# Patient Record
Sex: FEMALE | Race: WHITE | NOT HISPANIC OR LATINO | Employment: OTHER | URBAN - METROPOLITAN AREA
[De-identification: names, ages, dates, MRNs, and addresses within clinical notes are randomized per-mention and may not be internally consistent; named-entity substitution may affect disease eponyms.]

---

## 2023-09-20 ENCOUNTER — OFFICE VISIT (OUTPATIENT)
Dept: ORTHOPEDIC SURGERY | Facility: CLINIC | Age: 63
End: 2023-09-20
Payer: COMMERCIAL

## 2023-09-20 VITALS
WEIGHT: 199.2 LBS | DIASTOLIC BLOOD PRESSURE: 82 MMHG | BODY MASS INDEX: 39.11 KG/M2 | HEIGHT: 60 IN | SYSTOLIC BLOOD PRESSURE: 121 MMHG

## 2023-09-20 DIAGNOSIS — G56.03 CARPAL TUNNEL SYNDROME, BILATERAL: Primary | ICD-10-CM

## 2023-09-20 RX ORDER — DULOXETIN HYDROCHLORIDE 60 MG/1
60 CAPSULE, DELAYED RELEASE ORAL DAILY
COMMUNITY

## 2023-09-20 RX ORDER — ATENOLOL 50 MG/1
50 TABLET ORAL DAILY
COMMUNITY

## 2023-09-20 RX ORDER — LISINOPRIL AND HYDROCHLOROTHIAZIDE 12.5; 1 MG/1; MG/1
1 TABLET ORAL DAILY
COMMUNITY

## 2023-09-20 NOTE — PROGRESS NOTES
[facility]opedic     Office Visit       Date: 09/20/2023   Patient Name: Tamara Mantilla  MRN: [id number]  YOB: 1960    Referring Physician: Ed Eng, *     Chief Complaint:   Chief Complaint   Patient presents with    Pain     Bilateral wrist pain       History of Present Illness:   Tamara Mantilla is a 63 y.o. female RHD who presents with history of bilateral hand numbness and tingling. Patient is unclear how long it has been going on, but reports worsening symptoms over the last several months.  Reports numbness and tingling in the thumb, index and middle finger that was initially intermittent bilaterally but has now progressed to constant on the right side.  Her symptoms are worse at night and wakes her from sleep.  Has not tried conservative measures such as night splinting.  Reports weakness or clumsiness of the affected hand that interferes with her quality of life.    Otherwise healthy. Patient is retired. Denies smoking.         Subjective   Review of Systems:   Review of Systems   Constitutional: Negative.  Negative for chills, fatigue and fever.   HENT: Negative.  Negative for congestion and dental problem.    Eyes: Negative.  Negative for blurred vision.   Respiratory: Negative.  Negative for shortness of breath.    Cardiovascular: Negative.  Negative for leg swelling.   Gastrointestinal: Negative.  Negative for abdominal pain.   Endocrine: Negative.  Negative for polyuria.   Genitourinary: Negative.  Negative for difficulty urinating.   Musculoskeletal:  Positive for arthralgias.   Skin: Negative.    Allergic/Immunologic: Negative.    Neurological: Negative.    Hematological: Negative.  Negative for adenopathy.   Psychiatric/Behavioral: Negative.  Negative for behavioral problems.       Pertinent review of systems per HPI.     I reviewed the patient's chief complaint, history of present illness, review of  "systems, past medical history, surgical history, family history, social history, medications and allergy list.    Objective    Vital Signs:   Vitals:    09/20/23 1454   BP: 121/82   Weight: 90.4 kg (199 lb 3.2 oz)   Height: 151.1 cm (59.5\")     BMI: Class 2 Severe Obesity (BMI >=35 and <=39.9). Obesity-related health conditions include the following: none. Obesity is unchanged. BMI is is above average; no BMI management plan is appropriate.     General Appearance: No acute distress. Alert and oriented.     Chest:  Non-labored breathing on room air. Regular rate and rhythm.    Right Upper Extremity Exam:    No palpable masses or visible lesions  Fingers are warm, well-perfused with appropriate capillary refill.  Palpable radial pulse.    Sensation intact to light touch in median, radial and ulnar nerve distributions.    Motor- Fires FPL, ulnar intrinsics, EPL/EDC w/ full active and passive range of motion. Strength intact.    Non-tender except for in the areas highlighted below    Sensation Decreased to light touch in median nerve distribution  Tinel's Sign-- positive  Phalen's Test--positive   Carpal Compression Test--Positive  Thenar wasting--Negative  Elbow Flexion test--negative  Cubital tunnel signs--negative    Left Upper Extremity Exam:    No palpable masses or visible lesions  Fingers are warm, well-perfused with appropriate capillary refill.  Palpable radial pulse.    Sensation intact to light touch in median, radial and ulnar nerve distributions.    Motor- Fires FPL, ulnar intrinsics, EPL/EDC w/ full active and passive range of motion. Strength intact.    Non-tender except for in the areas highlighted below    Sensation Decreased to light touch in median nerve distribution  Tinel's Sign-- positive  Phalen's Test--positive   Carpal Compression Test--Positive  Thenar wasting--Negative  Elbow Flexion test--negative  Cubital tunnel signs--negative    CTS-6 Questionnaire        R/L  Symptoms and " History  Numbness in the median nerve territory  Yes/Yes (3.5)   Nocturnal numbness    Yes/Yes (4)   Physical Examination  Thenar atrophy and/or weakness   No/No (5)    Positive Phalen's test    Yes/Yes (5)   Loss of 2-point Discrimination >5 mm  Yes/Yes (4.5)  Positive Tinel sign     Yes/Yes (4)                Total    21/21 (26)      Imaging/Studies:   Imaging Results (Last 24 Hours)       ** No results found for the last 24 hours. **          EMG/nerve conduction studies from 9/1/2023 from Dr. Felix's office were independently reviewed by myself and demonstrate increased motor latency of the median nerve across the wrist of the bilateral wrist as well as increased sensory latency of the median nerve at the left across the wrist and absent sensory signals in the median nerve across the right wrist.    This consistent with bilateral severe carpal tunnel syndrome.    Procedures:  Procedures    Quality Measures:   ACP:   ACP discussion was declined by the patient. Patient does not have an advance directive, declines further assistance.    Tobacco:   Tamara Mantilla  reports that she has never smoked. She has never used smokeless tobacco..    Assessment / Plan    Assessment/Plan:     There are no diagnoses linked to this encounter.     Tamara Mantillais a 63 y.o. female who presents with:      ICD-10-CM ICD-9-CM   1. Carpal tunnel syndrome, bilateral  G56.03 354.0       Regarding Mrs. Mantilla's bilateral carpal tunnel syndrome, the patient has been informed on the various treatment options including surgical release, night splinting and corticosteroid injection.  Given her present presence of severe bilateral carpal tunnel syndrome, I do not believe that night splints or cortical and steroid injection would provide the patient with longstanding relief at this time.  She would benefit from bilateral carpal tunnel release, although given the severity of the demyelination of the right median nerve, she may not have full  recovery despite release.  Despite this I believe that her intermittent symptoms and worsening motor function warrant carpal tunnel release.  I believe that her left carpal tunnel syndrome has a better prognosis with release given its less severe symptoms.  I gave the patient the option of choosing the side that she would like released first and she opted for a right carpal tunnel release to address her dominant hand.  We will proceed with scheduling the surgery as an outpatient.    Consent-  Right open carpal tunnel release    The risks and benefits of the procedure were discussed with the patient and or appropriate guardian, which include but are not limited to the risk of bleeding, infection, neurovascular damage, post-operative stiffness, recurrence, tendon and/or ligament retears, recurrent instability, continued pain, arthritic pain, need for further revision surgeries in the future, deep venous thrombosis, and general risks from anesthesia. We also discussed the post-operative rehabilitation, the need for physical therapy, and the overall expected outcomes from the procedure. We also discussed the possible use of biologics including allograft. We allowed proper time and answered the patient's questions regarding the procedure. The patient expressed understanding. Knowing what the risks are and what the conservative treatment is, the patient elected to forgo any further conservative treatment options and proceed with the surgical intervention.  Patient signed a surgical consent today.      Follow Up:   Return for Follow Up after Post Op.        Jack Lopes MD  [facility] [place] [facility] Upper Extremity Surgeon

## 2023-10-10 ENCOUNTER — DOCUMENTATION (OUTPATIENT)
Dept: ORTHOPEDIC SURGERY | Facility: CLINIC | Age: 63
End: 2023-10-10

## 2023-10-10 ENCOUNTER — OUTSIDE FACILITY SERVICE (OUTPATIENT)
Dept: ORTHOPEDIC SURGERY | Facility: CLINIC | Age: 63
End: 2023-10-10
Payer: COMMERCIAL

## 2023-10-10 NOTE — PROGRESS NOTES
DATE OF PROCEDURE: 10/10/2023     PROCEDURES PERFORMED:    1. Right open carpal tunnel release CPT 71371    SURGEON: Jack Lopes MD      ASSISTANTS:    1. None        * Surgery not found *      ANESTHESIA: Local    PREOPERATIVE DIAGNOSES:  1. Right carpal tunnel syndrome     POSTOPERATIVE DIAGNOSES:    Same     ESTIMATED BLOOD LOSS: 1 mL.    SPECIMENS: None    IMPLANTS: None    COMPLICATIONS: None     INDICATIONS:  Tamara Mantilla is a 63 y.o. female who initially presented with right carpal tunnel syndrome that has failed conservative therapy.  The risks, benefits, alternatives and potential complications of surgery were discussed with the patient including but not limited to bleeding scarring, infection, recurrence, stiffness, damage to surrounding structures and postoperative pain.  The patient understands the risks and agreed to proceed with surgery.  A surgical informed consent was signed prior to the procedure.      DESCRIPTION OF PROCEDURE:  The patient was greeted in the pre-operative holding area and the surgical site was marked and consent confirmed prior to bringing the patient to the operating room.  The patient was then taken to the operating room and a timeout was performed including the patient's name, procedure and antibiotic administration prior to the patient receiving local anesthesia anesthesia.  The patient was positioned supine on the operative room table and a non-sterile tourniquet was applied to the right upper extremity and it was then prepped and draped in the usual sterile fashion.  A combination of 1% lidocaine with epinephrine and 0.5% Marcaine was injected at the site of the incision for local anesthesia.  No antibiotics were given.     A 2.5 cm incision was made over the palm on the ulnar border the anticipated location of the transverse carpal ligament.  Superficial palmar fascia was then incised sharply.  Retractors were placed to allow visualization of the transverse  fibers of the transverse carpal ligament.  This was then carefully incised using a 15 blade.  Proximally the transverse carpal ligament and antebrachial fascia were incised under direct visualization using careful retraction and a tenotomy scissor.  Wound was then irrigated with copious amounts normal saline solution.  Incision was closed with 4-0 nylon in horizontal mattress fashion.  Soft dressing was applied.     At the end of the procedure all instrument counts were correct.  The patient was awoken from anesthesia and transferred to the PACU in stable condition.  I participated in all parts of the case.

## 2023-10-25 ENCOUNTER — OFFICE VISIT (OUTPATIENT)
Dept: ORTHOPEDIC SURGERY | Facility: CLINIC | Age: 63
End: 2023-10-25
Payer: COMMERCIAL

## 2023-10-25 VITALS — TEMPERATURE: 97.6 F

## 2023-10-25 DIAGNOSIS — G56.03 CARPAL TUNNEL SYNDROME, BILATERAL: Primary | ICD-10-CM

## 2023-10-25 PROCEDURE — 99024 POSTOP FOLLOW-UP VISIT: CPT | Performed by: PLASTIC SURGERY

## 2023-10-25 NOTE — PROGRESS NOTES
[facility] Orth[facility]     Follow-up Office Visit       Date: 10/25/2023   Patient Name: Tamara Mantilla  MRN: [id number]  YOB: 1960    Chief Complaint:   Chief Complaint   Patient presents with    Post-op     2 Week Status Post - Right open carpal tunnel release 10/10/23       History of Present Illness:   Tamara Mantilla is a 63 y.o. female presents for follow-up of right open carpal tunnel release on 10/10/2023.  She has been doing well since surgery.  She reports that she still has some numbness at the tip of her right thumb however the remainder of symptoms have resolved.  She has had minimal pain since surgery.  No complaints at this time.    She continues to have symptoms of left carpal tunnel and would like to proceed with left carpal tunnel release at the earliest available time.      Subjective   Review of Systems:   Review of Systems   Constitutional: Negative.  Negative for chills, fatigue and fever.   HENT: Negative.  Negative for congestion and dental problem.    Eyes: Negative.  Negative for blurred vision.   Respiratory: Negative.  Negative for shortness of breath.    Cardiovascular: Negative.  Negative for leg swelling.   Gastrointestinal: Negative.  Negative for abdominal pain.   Endocrine: Negative.  Negative for polyuria.   Genitourinary: Negative.  Negative for difficulty urinating.   Musculoskeletal:  Positive for arthralgias.   Skin: Negative.    Allergic/Immunologic: Negative.    Neurological: Negative.    Hematological: Negative.  Negative for adenopathy.   Psychiatric/Behavioral: Negative.  Negative for behavioral problems.         Pertinent review of systems per HPI    I reviewed the patient's chief complaint, history of present illness, review of systems, past medical history, surgical history, family history, social history, medications and allergy list in the EMR on 10/25/2023 and agree with the findings  above.    Objective    Vital Signs:   Vitals:    10/25/23 0837   Temp: 97.6 °F (36.4 °C)   TempSrc: Tympanic     BMI: Class 2 Severe Obesity (BMI >=35 and <=39.9). Obesity-related health conditions include the following: none. Obesity is unchanged. BMI is is above average; no BMI management plan is appropriate. We discussed portion control and increasing exercise.       General Appearance: No acute distress. Alert and oriented.     Chest:  Non-labored breathing on room air      Right upper Extremity:  Incision healing appropriately.  Clear dry and intact.  Fingers warm and well-perfused distally  Sensation intact to light touch in the median, radian and ulnar nerve distributions    Imaging/Studies:   Imaging Results (Last 24 Hours)       ** No results found for the last 24 hours. **            None reviewed    Procedures:  Procedures    Quality Measures:   ACP:   ACP discussion was declined by the patient. Patient does not have an advance directive, declines further assistance.    Tobacco:   Tamara Mantilla  reports that she has never smoked. She has never been exposed to tobacco smoke. She has never used smokeless tobacco..  Assessment / Plan    Assessment/Plan:      Diagnosis Plan   1. Carpal tunnel syndrome, bilateral          Patient is 2 weeks postop status post right carpal tunnel release.  She is doing very well and happy with the result.  Sutures removed today.  She would like to proceed with left open carpal tunnel release.  Consent signed today.    Consent- Left Carpal Tunnel Release    The risks and benefits of the procedure were discussed with the patient and or appropriate guardian, which include but are not limited to the risk of bleeding, infection, neurovascular damage, post-operative stiffness, recurrence, tendon and/or ligament retears, recurrent instability, continued pain, arthritic pain, need for further revision surgeries in the future, deep venous thrombosis, and general risks from anesthesia.  We also discussed the post-operative rehabilitation, the need for physical therapy, and the overall expected outcomes from the procedure. We also discussed the possible use of biologics including allograft. We allowed proper time and answered the patient's questions regarding the procedure. The patient expressed understanding. Knowing what the risks are and what the conservative treatment is, the patient elected to forgo any further conservative treatment options and proceed with the surgical intervention. A surgical consent was signed.      Follow Up:   No follow-ups on file.        Jack Lopes MD  [facility] [place] [facility]

## 2023-11-21 ENCOUNTER — DOCUMENTATION (OUTPATIENT)
Dept: SURGERY | Facility: HOSPITAL | Age: 63
End: 2023-11-21
Payer: COMMERCIAL

## 2023-11-21 ENCOUNTER — OUTSIDE FACILITY SERVICE (OUTPATIENT)
Dept: ORTHOPEDIC SURGERY | Facility: CLINIC | Age: 63
End: 2023-11-21
Payer: COMMERCIAL

## 2023-11-21 NOTE — PROGRESS NOTES
DATE OF PROCEDURE: 11/21/2023    LOCATION: [facility]     PROCEDURES PERFORMED:    1. Left open carpal tunnel release CPT 96321    SURGEON: Jack Lopes MD      ASSISTANTS:    1. none        * Surgery not found *      ANESTHESIA: Local    PREOPERATIVE DIAGNOSES:  1. left carpal tunnel syndrome     POSTOPERATIVE DIAGNOSES:    Same     ESTIMATED BLOOD LOSS: 5 mL.    SPECIMENS: None    IMPLANTS: None    COMPLICATIONS: None     INDICATIONS:  Tamara Mantilla is a 63 y.o. female who initially presented with left carpal tunnel syndrome that has failed conservative therapy.  The risks, benefits, alternatives and potential complications of surgery were discussed with the patient including but not limited to bleeding scarring, infection, recurrence, stiffness, damage to surrounding structures and postoperative pain.  The patient understands the risks and agreed to proceed with surgery.  A surgical informed consent was signed prior to the procedure.      DESCRIPTION OF PROCEDURE:  The patient was greeted in the pre-operative holding area and the surgical site was marked and consent confirmed prior to bringing the patient to the operating room.  The patient was then taken to the operating room and a timeout was performed including the patient's name, procedure and antibiotic administration prior to the patient receiving local anesthesia anesthesia.  The patient was positioned supine on the operative room table and a non-sterile tourniquet was applied to the left upper extremity and it was then prepped and draped in the usual sterile fashion.  A combination of 1% lidocaine with epinephrine and 0.5% Marcaine was injected at the site of the incision for local anesthesia.  No antibiotics were given.     A 2.5 cm incision was made over the palm on the ulnar border the anticipated location of the transverse carpal ligament.  Superficial palmar fascia was then incised sharply.  Retractors were placed to  allow visualization of the transverse fibers of the transverse carpal ligament.  This was then carefully incised using a 15 blade.  Proximally the transverse carpal ligament and antebrachial fascia were incised under direct visualization using careful retraction and a tenotomy scissor.  Wound was then irrigated with copious amounts normal saline solution.  Incision was closed with 4-0 nylon in horizontal mattress fashion.  Soft dressing was applied.     At the end of the procedure all instrument counts were correct.  The patient was awoken from anesthesia and transferred to the PACU in stable condition.  I participated in all parts of the case.

## 2023-12-06 ENCOUNTER — OFFICE VISIT (OUTPATIENT)
Dept: ORTHOPEDIC SURGERY | Facility: CLINIC | Age: 63
End: 2023-12-06
Payer: COMMERCIAL

## 2023-12-06 VITALS — TEMPERATURE: 97.3 F

## 2023-12-06 DIAGNOSIS — G56.03 CARPAL TUNNEL SYNDROME, BILATERAL: Primary | ICD-10-CM

## 2023-12-06 RX ORDER — CEPHALEXIN 500 MG/1
500 CAPSULE ORAL EVERY 6 HOURS
Qty: 20 CAPSULE | Refills: 0 | Status: SHIPPED | OUTPATIENT
Start: 2023-12-06 | End: 2023-12-11

## 2023-12-06 NOTE — PROGRESS NOTES
[facility] Orthopedic     Follow-up Office Visit       Date: 12/06/2023   Patient Name: Tamara Mantilla  MRN: [id number]  YOB: 1960    Chief Complaint:   Chief Complaint   Patient presents with    Post-op     2 weeks S/P Left open carpal tunnel release (DOS 11/21/2023)       History of Present Illness:   Tamara Mantilla is a 63 y.o. female presents for 2-week follow-up status post left open carpal tunnel release.  She reports she has been doing well since surgery.  Reports her pain has been well-controlled.      Subjective   Review of Systems:   Review of Systems   Constitutional: Negative.  Negative for chills, fatigue and fever.   HENT: Negative.  Negative for congestion and dental problem.    Eyes: Negative.  Negative for blurred vision.   Respiratory: Negative.  Negative for shortness of breath.    Cardiovascular: Negative.  Negative for leg swelling.   Gastrointestinal: Negative.  Negative for abdominal pain.   Endocrine: Negative.  Negative for polyuria.   Genitourinary: Negative.  Negative for difficulty urinating.   Musculoskeletal:  Positive for arthralgias.   Skin: Negative.    Allergic/Immunologic: Negative.    Neurological: Negative.    Hematological: Negative.  Negative for adenopathy.   Psychiatric/Behavioral: Negative.  Negative for behavioral problems.         Pertinent review of systems per HPI    I reviewed the patient's chief complaint, history of present illness, review of systems, past medical history, surgical history, family history, social history, medications and allergy list in the EMR on 12/06/2023 and agree with the findings above.    Objective    Vital Signs:   Vitals:    12/06/23 0847   Temp: 97.3 °F (36.3 °C)     BMI: Class 2 Severe Obesity (BMI >=35 and <=39.9). Obesity-related health conditions include the following: none. Obesity is unchanged. BMI is is above average; no BMI management plan is  appropriate. We discussed portion control and increasing exercise.       General Appearance: No acute distress. Alert and oriented.     Chest:  Non-labored breathing on room air      Left upper Extremity:  Left hand incision clean dry intact and healing appropriately  Localized blanching erythema directly adjacent to the incision.  No underlying fluctuance or induration  Fingers warm and well-perfused distally  Sensation intact to light touch in the median, radian and ulnar nerve distributions    Imaging/Studies:   Imaging Results (Last 24 Hours)       ** No results found for the last 24 hours. **            No new imaging    Procedures:  Procedures    Quality Measures:   ACP:   ACP discussion was declined by the patient. Patient does not have an advance directive, declines further assistance.    Tobacco:   Tamara Mantilla  reports that she has never smoked. She has never been exposed to tobacco smoke. She has never used smokeless tobacco..          Assessment / Plan    Assessment/Plan:      Diagnosis Plan   1. Carpal tunnel syndrome, bilateral            Patient is now 2 weeks status post left carpal tunnel release.  She is doing well postoperatively and her sutures were removed today.  She reports improvement in her numbness and tingling of her bilateral hands now after bilateral carpal tunnel release.  She does have a little bit of localized erythema of her left incision that may be reactive however we will prescribe her a 5-day course of Keflex.  Recommend follow-up in 3 months.    Follow Up:   Return in about 3 months (around 3/6/2024).        Jack Lopes MD  [facility] [place] Hand and Upper Extremity Surgeon